# Patient Record
Sex: FEMALE
[De-identification: names, ages, dates, MRNs, and addresses within clinical notes are randomized per-mention and may not be internally consistent; named-entity substitution may affect disease eponyms.]

---

## 2020-05-28 ENCOUNTER — HOSPITAL ENCOUNTER (OUTPATIENT)
Dept: HOSPITAL 92 - SDC | Age: 60
Discharge: HOME | End: 2020-05-28
Attending: OPHTHALMOLOGY
Payer: COMMERCIAL

## 2020-05-28 DIAGNOSIS — Z91.048: ICD-10-CM

## 2020-05-28 DIAGNOSIS — Z91.040: ICD-10-CM

## 2020-05-28 DIAGNOSIS — Z79.899: ICD-10-CM

## 2020-05-28 DIAGNOSIS — H33.002: Primary | ICD-10-CM

## 2020-05-28 PROCEDURE — 08T53ZZ RESECTION OF LEFT VITREOUS, PERCUTANEOUS APPROACH: ICD-10-PCS | Performed by: OPHTHALMOLOGY

## 2020-05-28 PROCEDURE — 67025 REPLACE EYE FLUID: CPT

## 2020-05-28 NOTE — OP
DATE OF PROCEDURE:  05/28/2020



PREOPERATIVE DIAGNOSIS:  Rhegmatogenous retinal detachment, left eye.



POSTOPERATIVE DIAGNOSIS:  Rhegmatogenous retinal detachment, left eye.



PROCEDURES PERFORMED:  Pars plana vitrectomy and retinal detachment repair, left eye.



ANESTHESIA:  Local with monitored anesthesia care.



DESCRIPTION OF PROCEDURE:  The patient was identified in the preoperative holding

area.  Appropriate informed consent for the planned surgical procedure on the left

eye had been obtained.  The patient was transported to the operative suite.

Appropriate cardiopulmonary monitoring was established.  Local anesthesia was

obtained using retrobulbar block.  The patient was prepped and draped in usual

sterile manner for ophthalmic surgery on the left eye.  Lid speculum was placed in

the left eye.  A 25-gauge trocar was placed in the conjunctiva and sclera

supratemporally, inferotemporally, and supranasally.  Light pipe vitreous cutter

inserted into the eye.  Core vitrectomy was performed.  The area of retinal

detachment was identified.  A tear superotemporally was identified.  Traction was

removed from the detached and attached retina.  Complete air-fluid exchange was

performed with 10 minutes being left for fluid to drain posteriorly.  Laser was

placed in the superior 180 degrees with special attention to the horseshoe tear.  A

28% sulfur hexafluoride gas was infused into the eye.  Trocars were removed.  The

eye was noted to retain pressure well.  Retrobulbar Kenalog sequential Ancef was

placed.  Antibiotic ointment was placed.  Eye was patched and shielded.  The patient

was taken to the postop recovery unit in good condition, having suffered no

immediate perioperative complications.  The patient was instructed to keep patch

shield on, avoid lifting or bending.  Followup appointment with Dr. Vásquez. 







Job ID:  [id number]

## 2025-01-01 ENCOUNTER — HOSPITAL ENCOUNTER (EMERGENCY)
Dept: HOSPITAL 92 - CSHERS | Age: 65
Discharge: HOME | End: 2025-01-01
Payer: COMMERCIAL

## 2025-01-01 DIAGNOSIS — I10: ICD-10-CM

## 2025-01-01 DIAGNOSIS — R31.9: ICD-10-CM

## 2025-01-01 DIAGNOSIS — E78.5: ICD-10-CM

## 2025-01-01 DIAGNOSIS — Z75.8: ICD-10-CM

## 2025-01-01 DIAGNOSIS — N39.0: Primary | ICD-10-CM

## 2025-01-01 DIAGNOSIS — F17.210: ICD-10-CM

## 2025-01-01 LAB
BACTERIA UR QL AUTO: (no result) HPF
CAUTI INDICATIONS FOR CULTURE: (no result)
LEUKOCYTE ESTERASE UR QL STRIP.AUTO: 500
PROT UR STRIP.AUTO-MCNC: 100 MG/DL
RBC UR QL AUTO: (no result) HPF (ref 0–3)
SP GR UR STRIP: 1.01 (ref 1–1.03)
WBC UR QL AUTO: (no result) HPF (ref 0–3)

## 2025-01-01 PROCEDURE — 81001 URINALYSIS AUTO W/SCOPE: CPT

## 2025-01-01 PROCEDURE — 99284 EMERGENCY DEPT VISIT MOD MDM: CPT

## 2025-01-01 PROCEDURE — 87086 URINE CULTURE/COLONY COUNT: CPT
